# Patient Record
Sex: FEMALE | Race: OTHER | NOT HISPANIC OR LATINO
[De-identification: names, ages, dates, MRNs, and addresses within clinical notes are randomized per-mention and may not be internally consistent; named-entity substitution may affect disease eponyms.]

---

## 2017-07-06 PROBLEM — Z00.00 ENCOUNTER FOR PREVENTIVE HEALTH EXAMINATION: Status: ACTIVE | Noted: 2017-07-06

## 2017-07-11 ENCOUNTER — APPOINTMENT (OUTPATIENT)
Dept: SURGERY | Facility: CLINIC | Age: 37
End: 2017-07-11

## 2017-07-11 ENCOUNTER — OUTPATIENT (OUTPATIENT)
Dept: OUTPATIENT SERVICES | Facility: HOSPITAL | Age: 37
LOS: 1 days | End: 2017-07-11

## 2017-07-11 DIAGNOSIS — Z01.818 ENCOUNTER FOR OTHER PREPROCEDURAL EXAMINATION: ICD-10-CM

## 2017-07-11 DIAGNOSIS — L40.9 PSORIASIS, UNSPECIFIED: ICD-10-CM

## 2017-07-11 DIAGNOSIS — N88.3 INCOMPETENCE OF CERVIX UTERI: ICD-10-CM

## 2017-07-11 DIAGNOSIS — J30.9 ALLERGIC RHINITIS, UNSPECIFIED: ICD-10-CM

## 2017-07-14 ENCOUNTER — OUTPATIENT (OUTPATIENT)
Dept: OUTPATIENT SERVICES | Facility: HOSPITAL | Age: 37
LOS: 1 days | Discharge: HOME | End: 2017-07-14

## 2017-07-14 DIAGNOSIS — O26.872 CERVICAL SHORTENING, SECOND TRIMESTER: ICD-10-CM

## 2017-07-14 DIAGNOSIS — L40.9 PSORIASIS, UNSPECIFIED: ICD-10-CM

## 2017-07-14 DIAGNOSIS — Z3A.15 15 WEEKS GESTATION OF PREGNANCY: ICD-10-CM

## 2017-10-02 ENCOUNTER — INPATIENT (INPATIENT)
Facility: HOSPITAL | Age: 37
LOS: 27 days | Discharge: HOME | DRG: 778 | End: 2017-10-30
Attending: OBSTETRICS & GYNECOLOGY | Admitting: OBSTETRICS & GYNECOLOGY
Payer: COMMERCIAL

## 2017-10-02 VITALS — HEIGHT: 64 IN | WEIGHT: 119.05 LBS

## 2017-10-02 DIAGNOSIS — O26.899 OTHER SPECIFIED PREGNANCY RELATED CONDITIONS, UNSPECIFIED TRIMESTER: ICD-10-CM

## 2017-10-02 DIAGNOSIS — Z3A.00 WEEKS OF GESTATION OF PREGNANCY NOT SPECIFIED: ICD-10-CM

## 2017-10-02 LAB
ALBUMIN SERPL ELPH-MCNC: 3.3 G/DL — SIGNIFICANT CHANGE UP (ref 3.3–5)
ALP SERPL-CCNC: 108 U/L — SIGNIFICANT CHANGE UP (ref 40–120)
ALT FLD-CCNC: 19 U/L — SIGNIFICANT CHANGE UP (ref 10–45)
ANION GAP SERPL CALC-SCNC: 17 MMOL/L — SIGNIFICANT CHANGE UP (ref 5–17)
APPEARANCE UR: CLEAR — SIGNIFICANT CHANGE UP
APTT BLD: 26.2 SEC — LOW (ref 27.5–37.4)
AST SERPL-CCNC: 21 U/L — SIGNIFICANT CHANGE UP (ref 10–40)
BILIRUB SERPL-MCNC: <0.2 MG/DL — SIGNIFICANT CHANGE UP (ref 0.2–1.2)
BILIRUB UR-MCNC: NEGATIVE — SIGNIFICANT CHANGE UP
BLD GP AB SCN SERPL QL: NEGATIVE — SIGNIFICANT CHANGE UP
BUN SERPL-MCNC: 6 MG/DL — LOW (ref 7–23)
CALCIUM SERPL-MCNC: 8.7 MG/DL — SIGNIFICANT CHANGE UP (ref 8.4–10.5)
CHLORIDE SERPL-SCNC: 100 MMOL/L — SIGNIFICANT CHANGE UP (ref 96–108)
CO2 SERPL-SCNC: 20 MMOL/L — LOW (ref 22–31)
COLOR SPEC: YELLOW — SIGNIFICANT CHANGE UP
CREAT SERPL-MCNC: 0.34 MG/DL — LOW (ref 0.5–1.3)
DIFF PNL FLD: (no result)
FIBRINOGEN PPP-MCNC: 400 MG/DL — SIGNIFICANT CHANGE UP (ref 258–438)
GLUCOSE SERPL-MCNC: 86 MG/DL — SIGNIFICANT CHANGE UP (ref 70–99)
GLUCOSE UR QL: NEGATIVE — SIGNIFICANT CHANGE UP
HCT VFR BLD CALC: 32.6 % — LOW (ref 34.5–45)
HGB BLD-MCNC: 10.7 G/DL — LOW (ref 11.5–15.5)
INR BLD: 0.94 — SIGNIFICANT CHANGE UP (ref 0.88–1.16)
KETONES UR-MCNC: NEGATIVE — SIGNIFICANT CHANGE UP
LDH SERPL L TO P-CCNC: 219 U/L — SIGNIFICANT CHANGE UP (ref 50–242)
LEUKOCYTE ESTERASE UR-ACNC: (no result)
MCHC RBC-ENTMCNC: 29.1 PG — SIGNIFICANT CHANGE UP (ref 27–34)
MCHC RBC-ENTMCNC: 32.8 G/DL — SIGNIFICANT CHANGE UP (ref 32–36)
MCV RBC AUTO: 88.6 FL — SIGNIFICANT CHANGE UP (ref 80–100)
NITRITE UR-MCNC: NEGATIVE — SIGNIFICANT CHANGE UP
PH UR: 6 — SIGNIFICANT CHANGE UP (ref 5–8)
PLATELET # BLD AUTO: 200 K/UL — SIGNIFICANT CHANGE UP (ref 150–400)
POTASSIUM SERPL-MCNC: 3.8 MMOL/L — SIGNIFICANT CHANGE UP (ref 3.5–5.3)
POTASSIUM SERPL-SCNC: 3.8 MMOL/L — SIGNIFICANT CHANGE UP (ref 3.5–5.3)
PROT SERPL-MCNC: 6.7 G/DL — SIGNIFICANT CHANGE UP (ref 6–8.3)
PROT UR-MCNC: NEGATIVE MG/DL — SIGNIFICANT CHANGE UP
PROTHROM AB SERPL-ACNC: 10.4 SEC — SIGNIFICANT CHANGE UP (ref 9.8–12.7)
RBC # BLD: 3.68 M/UL — LOW (ref 3.8–5.2)
RBC # FLD: 13.4 % — SIGNIFICANT CHANGE UP (ref 10.3–16.9)
RH IG SCN BLD-IMP: POSITIVE — SIGNIFICANT CHANGE UP
RH IG SCN BLD-IMP: POSITIVE — SIGNIFICANT CHANGE UP
SODIUM SERPL-SCNC: 137 MMOL/L — SIGNIFICANT CHANGE UP (ref 135–145)
SP GR SPEC: <=1.005 — SIGNIFICANT CHANGE UP (ref 1–1.03)
T PALLIDUM AB TITR SER: NEGATIVE — SIGNIFICANT CHANGE UP
URATE SERPL-MCNC: 1.9 MG/DL — LOW (ref 2.5–7)
UROBILINOGEN FLD QL: 0.2 E.U./DL — SIGNIFICANT CHANGE UP
WBC # BLD: 13.2 K/UL — HIGH (ref 3.8–10.5)
WBC # FLD AUTO: 13.2 K/UL — HIGH (ref 3.8–10.5)

## 2017-10-02 RX ORDER — SODIUM CHLORIDE 9 MG/ML
1000 INJECTION, SOLUTION INTRAVENOUS
Qty: 0 | Refills: 0 | Status: DISCONTINUED | OUTPATIENT
Start: 2017-10-02 | End: 2017-10-15

## 2017-10-02 RX ORDER — PROGESTERONE 200 MG/1
200 CAPSULE, LIQUID FILLED ORAL AT BEDTIME
Qty: 0 | Refills: 0 | Status: DISCONTINUED | OUTPATIENT
Start: 2017-10-02 | End: 2017-10-04

## 2017-10-02 RX ORDER — NIFEDIPINE 30 MG
10 TABLET, EXTENDED RELEASE 24 HR ORAL EVERY 4 HOURS
Qty: 0 | Refills: 0 | Status: DISCONTINUED | OUTPATIENT
Start: 2017-10-02 | End: 2017-10-06

## 2017-10-02 RX ORDER — SODIUM CHLORIDE 9 MG/ML
1000 INJECTION, SOLUTION INTRAVENOUS ONCE
Qty: 0 | Refills: 0 | Status: COMPLETED | OUTPATIENT
Start: 2017-10-02 | End: 2017-10-02

## 2017-10-02 RX ADMIN — SODIUM CHLORIDE 150 MILLILITER(S): 9 INJECTION, SOLUTION INTRAVENOUS at 23:17

## 2017-10-02 RX ADMIN — Medication 10 MILLIGRAM(S): at 14:32

## 2017-10-02 RX ADMIN — Medication 1 TABLET(S): at 22:17

## 2017-10-02 RX ADMIN — SODIUM CHLORIDE 150 MILLILITER(S): 9 INJECTION, SOLUTION INTRAVENOUS at 02:28

## 2017-10-02 RX ADMIN — PROGESTERONE 200 MILLIGRAM(S): 200 CAPSULE, LIQUID FILLED ORAL at 22:16

## 2017-10-02 RX ADMIN — Medication 10 MILLIGRAM(S): at 11:26

## 2017-10-02 RX ADMIN — Medication 10 MILLIGRAM(S): at 22:17

## 2017-10-02 RX ADMIN — Medication 12 MILLIGRAM(S): at 02:53

## 2017-10-02 RX ADMIN — SODIUM CHLORIDE 2000 MILLILITER(S): 9 INJECTION, SOLUTION INTRAVENOUS at 02:00

## 2017-10-02 RX ADMIN — Medication 10 MILLIGRAM(S): at 18:38

## 2017-10-03 LAB
C TRACH RRNA SPEC QL NAA+PROBE: SIGNIFICANT CHANGE UP
CULTURE RESULTS: NO GROWTH — SIGNIFICANT CHANGE UP
N GONORRHOEA RRNA SPEC QL NAA+PROBE: SIGNIFICANT CHANGE UP
SPECIMEN SOURCE: SIGNIFICANT CHANGE UP
SPECIMEN SOURCE: SIGNIFICANT CHANGE UP

## 2017-10-03 RX ADMIN — Medication 10 MILLIGRAM(S): at 13:53

## 2017-10-03 RX ADMIN — PROGESTERONE 200 MILLIGRAM(S): 200 CAPSULE, LIQUID FILLED ORAL at 22:01

## 2017-10-03 RX ADMIN — Medication 1 TABLET(S): at 22:01

## 2017-10-03 RX ADMIN — SODIUM CHLORIDE 150 MILLILITER(S): 9 INJECTION, SOLUTION INTRAVENOUS at 05:26

## 2017-10-03 RX ADMIN — Medication 10 MILLIGRAM(S): at 09:53

## 2017-10-03 RX ADMIN — Medication 10 MILLIGRAM(S): at 22:01

## 2017-10-03 RX ADMIN — SODIUM CHLORIDE 150 MILLILITER(S): 9 INJECTION, SOLUTION INTRAVENOUS at 13:20

## 2017-10-03 RX ADMIN — Medication 10 MILLIGRAM(S): at 17:59

## 2017-10-03 RX ADMIN — Medication 12 MILLIGRAM(S): at 02:09

## 2017-10-04 LAB
CULTURE RESULTS: SIGNIFICANT CHANGE UP
SPECIMEN SOURCE: SIGNIFICANT CHANGE UP

## 2017-10-04 RX ORDER — INFLUENZA VIRUS VACCINE 15; 15; 15; 15 UG/.5ML; UG/.5ML; UG/.5ML; UG/.5ML
0.5 SUSPENSION INTRAMUSCULAR ONCE
Qty: 0 | Refills: 0 | Status: COMPLETED | OUTPATIENT
Start: 2017-10-04 | End: 2017-10-04

## 2017-10-04 RX ADMIN — Medication 10 MILLIGRAM(S): at 18:27

## 2017-10-04 RX ADMIN — Medication 10 MILLIGRAM(S): at 10:22

## 2017-10-04 RX ADMIN — SODIUM CHLORIDE 150 MILLILITER(S): 9 INJECTION, SOLUTION INTRAVENOUS at 10:30

## 2017-10-04 RX ADMIN — Medication 10 MILLIGRAM(S): at 02:11

## 2017-10-04 RX ADMIN — Medication 10 MILLIGRAM(S): at 06:15

## 2017-10-04 RX ADMIN — Medication 10 MILLIGRAM(S): at 22:04

## 2017-10-04 RX ADMIN — Medication 1 TABLET(S): at 22:05

## 2017-10-04 RX ADMIN — Medication 10 MILLIGRAM(S): at 14:00

## 2017-10-05 LAB
BASOPHILS NFR BLD AUTO: 0.1 % — SIGNIFICANT CHANGE UP (ref 0–2)
BLD GP AB SCN SERPL QL: NEGATIVE — SIGNIFICANT CHANGE UP
EOSINOPHIL NFR BLD AUTO: 1.3 % — SIGNIFICANT CHANGE UP (ref 0–6)
HCT VFR BLD CALC: 29.7 % — LOW (ref 34.5–45)
HGB BLD-MCNC: 9.9 G/DL — LOW (ref 11.5–15.5)
LYMPHOCYTES # BLD AUTO: 18.6 % — SIGNIFICANT CHANGE UP (ref 13–44)
MCHC RBC-ENTMCNC: 29.3 PG — SIGNIFICANT CHANGE UP (ref 27–34)
MCHC RBC-ENTMCNC: 33.3 G/DL — SIGNIFICANT CHANGE UP (ref 32–36)
MCV RBC AUTO: 87.9 FL — SIGNIFICANT CHANGE UP (ref 80–100)
MONOCYTES NFR BLD AUTO: 5.1 % — SIGNIFICANT CHANGE UP (ref 2–14)
NEUTROPHILS NFR BLD AUTO: 74.9 % — SIGNIFICANT CHANGE UP (ref 43–77)
PLATELET # BLD AUTO: 182 K/UL — SIGNIFICANT CHANGE UP (ref 150–400)
RBC # BLD: 3.38 M/UL — LOW (ref 3.8–5.2)
RBC # FLD: 13.9 % — SIGNIFICANT CHANGE UP (ref 10.3–16.9)
RH IG SCN BLD-IMP: POSITIVE — SIGNIFICANT CHANGE UP
WBC # BLD: 12.6 K/UL — HIGH (ref 3.8–10.5)
WBC # FLD AUTO: 12.6 K/UL — HIGH (ref 3.8–10.5)

## 2017-10-05 RX ADMIN — Medication 1 TABLET(S): at 22:21

## 2017-10-05 RX ADMIN — Medication 10 MILLIGRAM(S): at 14:02

## 2017-10-05 RX ADMIN — Medication 10 MILLIGRAM(S): at 18:08

## 2017-10-05 RX ADMIN — SODIUM CHLORIDE 150 MILLILITER(S): 9 INJECTION, SOLUTION INTRAVENOUS at 10:00

## 2017-10-05 RX ADMIN — Medication 10 MILLIGRAM(S): at 10:02

## 2017-10-05 RX ADMIN — Medication 10 MILLIGRAM(S): at 06:44

## 2017-10-05 RX ADMIN — Medication 10 MILLIGRAM(S): at 22:21

## 2017-10-05 RX ADMIN — Medication 10 MILLIGRAM(S): at 06:45

## 2017-10-05 NOTE — DIETITIAN INITIAL EVALUATION ADULT. - ENERGY NEEDS
pre-pregnancy wt: 45kg, ABW: 53KG, IBW: 54.5kg  %IBW: 83%  BMI:  17.0-> anthropometrics per pre-pregnancy wt and needs per IBW to provide additional calories as pt was on lower end of IBW; Kcal needs: 25-30kcal/IBW +340kcal: 1703-1975KCAL; Fluid: 35-40ml/IBW: 1908-2180ml

## 2017-10-05 NOTE — DIETITIAN INITIAL EVALUATION ADULT. - OTHER INFO
36 y/o F, w/ cervical insufficiency, on regular diet, follows vegeterian diet, but does not want to change diet (regular) since she wants to make more liberal choices; denies N/V/D/C; last BM was today; po intake >75% at most meals; pt gained 18#  x ~27 weeks, rec'd range 28-40# per her pre-pregnancy BMI 17.0; nutrition education on pregnancy provided, contact information left for any questions; skin intact w/ no edema.

## 2017-10-06 RX ORDER — NIFEDIPINE 30 MG
10 TABLET, EXTENDED RELEASE 24 HR ORAL ONCE
Qty: 0 | Refills: 0 | Status: COMPLETED | OUTPATIENT
Start: 2017-10-07 | End: 2017-10-07

## 2017-10-06 RX ORDER — NIFEDIPINE 30 MG
10 TABLET, EXTENDED RELEASE 24 HR ORAL EVERY 6 HOURS
Qty: 0 | Refills: 0 | Status: DISCONTINUED | OUTPATIENT
Start: 2017-10-07 | End: 2017-10-30

## 2017-10-06 RX ORDER — DOCUSATE SODIUM 100 MG
100 CAPSULE ORAL DAILY
Qty: 0 | Refills: 0 | Status: DISCONTINUED | OUTPATIENT
Start: 2017-10-06 | End: 2017-10-30

## 2017-10-06 RX ADMIN — Medication 10 MILLIGRAM(S): at 06:10

## 2017-10-06 RX ADMIN — Medication 1 TABLET(S): at 22:07

## 2017-10-06 RX ADMIN — Medication 10 MILLIGRAM(S): at 10:22

## 2017-10-06 RX ADMIN — Medication 10 MILLIGRAM(S): at 14:09

## 2017-10-06 RX ADMIN — SODIUM CHLORIDE 150 MILLILITER(S): 9 INJECTION, SOLUTION INTRAVENOUS at 15:40

## 2017-10-06 RX ADMIN — Medication 10 MILLIGRAM(S): at 22:07

## 2017-10-07 RX ORDER — ACETAMINOPHEN 500 MG
650 TABLET ORAL ONCE
Qty: 0 | Refills: 0 | Status: COMPLETED | OUTPATIENT
Start: 2017-10-07 | End: 2017-10-07

## 2017-10-07 RX ADMIN — Medication 10 MILLIGRAM(S): at 23:44

## 2017-10-07 RX ADMIN — Medication 10 MILLIGRAM(S): at 12:28

## 2017-10-07 RX ADMIN — Medication 10 MILLIGRAM(S): at 02:04

## 2017-10-07 RX ADMIN — Medication 10 MILLIGRAM(S): at 18:35

## 2017-10-07 RX ADMIN — Medication 10 MILLIGRAM(S): at 07:01

## 2017-10-07 RX ADMIN — Medication 650 MILLIGRAM(S): at 14:12

## 2017-10-07 RX ADMIN — Medication 1 TABLET(S): at 23:05

## 2017-10-07 RX ADMIN — Medication 650 MILLIGRAM(S): at 15:00

## 2017-10-08 LAB
BLD GP AB SCN SERPL QL: NEGATIVE — SIGNIFICANT CHANGE UP
RH IG SCN BLD-IMP: POSITIVE — SIGNIFICANT CHANGE UP

## 2017-10-08 RX ORDER — HEPARIN SODIUM 5000 [USP'U]/ML
5000 INJECTION INTRAVENOUS; SUBCUTANEOUS EVERY 12 HOURS
Qty: 0 | Refills: 0 | Status: DISCONTINUED | OUTPATIENT
Start: 2017-10-08 | End: 2017-10-30

## 2017-10-08 RX ADMIN — Medication 30 MILLILITER(S): at 15:36

## 2017-10-08 RX ADMIN — Medication 10 MILLIGRAM(S): at 18:08

## 2017-10-08 RX ADMIN — Medication 10 MILLIGRAM(S): at 12:03

## 2017-10-08 RX ADMIN — Medication 10 MILLIGRAM(S): at 06:34

## 2017-10-08 RX ADMIN — HEPARIN SODIUM 5000 UNIT(S): 5000 INJECTION INTRAVENOUS; SUBCUTANEOUS at 19:10

## 2017-10-08 RX ADMIN — Medication 1 TABLET(S): at 22:15

## 2017-10-09 RX ADMIN — Medication 10 MILLIGRAM(S): at 12:30

## 2017-10-09 RX ADMIN — Medication 1 TABLET(S): at 21:39

## 2017-10-09 RX ADMIN — Medication 10 MILLIGRAM(S): at 06:33

## 2017-10-09 RX ADMIN — Medication 10 MILLIGRAM(S): at 19:10

## 2017-10-09 RX ADMIN — Medication 10 MILLIGRAM(S): at 00:01

## 2017-10-09 RX ADMIN — HEPARIN SODIUM 5000 UNIT(S): 5000 INJECTION INTRAVENOUS; SUBCUTANEOUS at 18:30

## 2017-10-09 RX ADMIN — HEPARIN SODIUM 5000 UNIT(S): 5000 INJECTION INTRAVENOUS; SUBCUTANEOUS at 07:09

## 2017-10-10 LAB
GLUCOSE SERPL-MCNC: 152 MG/DL — HIGH (ref 70–99)
HCT VFR BLD CALC: 32.3 % — LOW (ref 34.5–45)
HGB BLD-MCNC: 11 G/DL — LOW (ref 11.5–15.5)
MCHC RBC-ENTMCNC: 29.7 PG — SIGNIFICANT CHANGE UP (ref 27–34)
MCHC RBC-ENTMCNC: 34.1 G/DL — SIGNIFICANT CHANGE UP (ref 32–36)
MCV RBC AUTO: 87.3 FL — SIGNIFICANT CHANGE UP (ref 80–100)
PLATELET # BLD AUTO: 189 K/UL — SIGNIFICANT CHANGE UP (ref 150–400)
RBC # BLD: 3.7 M/UL — LOW (ref 3.8–5.2)
RBC # FLD: 13.7 % — SIGNIFICANT CHANGE UP (ref 10.3–16.9)
WBC # BLD: 11.4 K/UL — HIGH (ref 3.8–10.5)
WBC # FLD AUTO: 11.4 K/UL — HIGH (ref 3.8–10.5)

## 2017-10-10 RX ORDER — DEXTROSE 50 % IN WATER 50 %
100 SYRINGE (ML) INTRAVENOUS ONCE
Qty: 0 | Refills: 0 | Status: COMPLETED | OUTPATIENT
Start: 2017-10-11 | End: 2017-10-11

## 2017-10-10 RX ORDER — DEXTROSE 50 % IN WATER 50 %
50 SYRINGE (ML) INTRAVENOUS ONCE
Qty: 0 | Refills: 0 | Status: DISCONTINUED | OUTPATIENT
Start: 2017-10-10 | End: 2017-10-12

## 2017-10-10 RX ADMIN — HEPARIN SODIUM 5000 UNIT(S): 5000 INJECTION INTRAVENOUS; SUBCUTANEOUS at 06:28

## 2017-10-10 RX ADMIN — Medication 10 MILLIGRAM(S): at 17:48

## 2017-10-10 RX ADMIN — Medication 10 MILLIGRAM(S): at 06:28

## 2017-10-10 RX ADMIN — Medication 10 MILLIGRAM(S): at 12:21

## 2017-10-10 RX ADMIN — Medication 1 TABLET(S): at 21:51

## 2017-10-10 RX ADMIN — Medication 10 MILLIGRAM(S): at 00:09

## 2017-10-10 RX ADMIN — Medication 10 MILLIGRAM(S): at 23:57

## 2017-10-11 LAB
BLD GP AB SCN SERPL QL: NEGATIVE — SIGNIFICANT CHANGE UP
GLUCOSE SERPL-MCNC: 128 MG/DL — HIGH (ref 70–99)
GLUCOSE SERPL-MCNC: 155 MG/DL — HIGH (ref 70–99)
GLUCOSE SERPL-MCNC: 159 MG/DL — HIGH (ref 70–99)
GLUCOSE SERPL-MCNC: 82 MG/DL — SIGNIFICANT CHANGE UP (ref 70–99)
RH IG SCN BLD-IMP: POSITIVE — SIGNIFICANT CHANGE UP

## 2017-10-11 RX ADMIN — Medication 100 MILLIGRAM(S): at 14:10

## 2017-10-11 RX ADMIN — Medication 10 MILLIGRAM(S): at 12:07

## 2017-10-11 RX ADMIN — Medication 10 MILLIGRAM(S): at 18:14

## 2017-10-11 RX ADMIN — Medication 100 GRAM(S): at 07:20

## 2017-10-11 RX ADMIN — Medication 1 TABLET(S): at 21:55

## 2017-10-11 RX ADMIN — Medication 10 MILLIGRAM(S): at 05:51

## 2017-10-12 RX ADMIN — Medication 10 MILLIGRAM(S): at 12:21

## 2017-10-12 RX ADMIN — Medication 10 MILLIGRAM(S): at 18:16

## 2017-10-12 RX ADMIN — Medication 10 MILLIGRAM(S): at 01:04

## 2017-10-12 RX ADMIN — Medication 10 MILLIGRAM(S): at 23:51

## 2017-10-12 RX ADMIN — Medication 1 TABLET(S): at 22:33

## 2017-10-12 RX ADMIN — Medication 10 MILLIGRAM(S): at 06:29

## 2017-10-12 NOTE — CHART NOTE - NSCHARTNOTEFT_GEN_A_CORE
Admitting Diagnosis:   Patient is a 37y old  Female who presents with a chief complaint of cervical insufficiency.     PAST MEDICAL & SURGICAL HISTORY:      Current Nutrition Order: regular       PO Intake: Good (%) [  X]  Fair (50-75%) [   ] Poor (<25%) [   ]    GI Issues: none, last BM yesterday    Pain: none    Skin Integrity: intact    Labs:   10-11    x   |  x   |  x   ----------------------------<  128<H>  x    |  x   |  x         CAPILLARY BLOOD GLUCOSE          Medications:  MEDICATIONS  (STANDING):  docusate sodium 100 milliGRAM(s) Oral daily  heparin  Injectable 5000 Unit(s) SubCutaneous every 12 hours  lactated ringers. 1000 milliLiter(s) (150 mL/Hr) IV Continuous <Continuous>  NIFEdipine IR 10 milliGRAM(s) Oral every 6 hours  prenatal multivitamin 1 Tablet(s) Oral daily  progesterone vaginal capsule 200 milliGRAM(s) 200 milliGRAM(s) Vaginal at bedtime    MEDICATIONS  (PRN):      Weight: (10/2) 54kg  Daily     Daily     Weight Change: no updates on wt to assess; wt updates will appreciated to assess current wt status    Estimated energy needs:   pre-pregnancy wt: 45kg, IBW: 54.5kg  %IBW: 83%  BMI:  17.0-> will use IBW for additional needs per low BMI; Kcal needs: 25-30kcal/IBW +452kcal: 1815-2087Kcal; protein: 1.4-1.6g/IBW: 76.3-87.2g;  Fluid: 35-40ml/IBW: 1908-2180ml    Subjective: pt lays comfortably in her bed; 28 weeks of gestational age; no c/o pain, GI issues; reports eating >75% at meals between the food available inhouse and brought in by pt's family; pt has alternative menu to vary her selections; wt updates will be beneficial to assess pt's status; nutrition education with important nutrients per pregnancy reviewed, pt is compliant w/ pregnancy nutrition therapy; skin intact w/ no edema.     Previous Nutrition Diagnosis: Increased nutrient needs RT increased demand for nutrients per anabolism AEB pregnancy (3rd trimester)      Active [  X]  Resolved [   ]    If resolved, new PES:     Goal: Pt to meet >75% EER without nutrient deficiencies    Recommendations: -encourage po intake w/ meals and snacks; provide pt's food preferences.   -continue w/ prenatal MVI   -provide/review nutrition education as needed     Education: pregnancy nutrition therapy     Risk Level: High [   ] Moderate [  X ] Low [   ]

## 2017-10-13 RX ORDER — SODIUM CHLORIDE 9 MG/ML
1000 INJECTION, SOLUTION INTRAVENOUS
Qty: 0 | Refills: 0 | Status: DISCONTINUED | OUTPATIENT
Start: 2017-10-13 | End: 2017-10-15

## 2017-10-13 RX ADMIN — Medication 10 MILLIGRAM(S): at 12:00

## 2017-10-13 RX ADMIN — Medication 10 MILLIGRAM(S): at 23:58

## 2017-10-13 RX ADMIN — Medication 1 TABLET(S): at 22:08

## 2017-10-13 RX ADMIN — Medication 100 MILLIGRAM(S): at 12:00

## 2017-10-13 RX ADMIN — Medication 10 MILLIGRAM(S): at 06:32

## 2017-10-13 RX ADMIN — Medication 10 MILLIGRAM(S): at 18:14

## 2017-10-13 RX ADMIN — SODIUM CHLORIDE 125 MILLILITER(S): 9 INJECTION, SOLUTION INTRAVENOUS at 18:01

## 2017-10-14 RX ADMIN — Medication 1 TABLET(S): at 22:55

## 2017-10-14 RX ADMIN — Medication 10 MILLIGRAM(S): at 18:05

## 2017-10-14 RX ADMIN — Medication 10 MILLIGRAM(S): at 11:57

## 2017-10-14 RX ADMIN — Medication 10 MILLIGRAM(S): at 06:01

## 2017-10-14 RX ADMIN — Medication 100 MILLIGRAM(S): at 11:57

## 2017-10-14 RX ADMIN — SODIUM CHLORIDE 125 MILLILITER(S): 9 INJECTION, SOLUTION INTRAVENOUS at 11:57

## 2017-10-14 RX ADMIN — SODIUM CHLORIDE 125 MILLILITER(S): 9 INJECTION, SOLUTION INTRAVENOUS at 19:21

## 2017-10-15 RX ADMIN — Medication 10 MILLIGRAM(S): at 07:11

## 2017-10-15 RX ADMIN — Medication 100 MILLIGRAM(S): at 11:49

## 2017-10-15 RX ADMIN — Medication 10 MILLIGRAM(S): at 11:49

## 2017-10-15 RX ADMIN — Medication 10 MILLIGRAM(S): at 00:04

## 2017-10-15 RX ADMIN — Medication 1 TABLET(S): at 22:12

## 2017-10-15 RX ADMIN — Medication 10 MILLIGRAM(S): at 18:16

## 2017-10-15 RX ADMIN — Medication 10 MILLIGRAM(S): at 23:52

## 2017-10-16 LAB
BLD GP AB SCN SERPL QL: NEGATIVE — SIGNIFICANT CHANGE UP
HCT VFR BLD CALC: 29.4 % — LOW (ref 34.5–45)
HGB BLD-MCNC: 9.9 G/DL — LOW (ref 11.5–15.5)
MCHC RBC-ENTMCNC: 29.6 PG — SIGNIFICANT CHANGE UP (ref 27–34)
MCHC RBC-ENTMCNC: 33.7 G/DL — SIGNIFICANT CHANGE UP (ref 32–36)
MCV RBC AUTO: 87.8 FL — SIGNIFICANT CHANGE UP (ref 80–100)
PLATELET # BLD AUTO: 172 K/UL — SIGNIFICANT CHANGE UP (ref 150–400)
RBC # BLD: 3.35 M/UL — LOW (ref 3.8–5.2)
RBC # FLD: 13.7 % — SIGNIFICANT CHANGE UP (ref 10.3–16.9)
RH IG SCN BLD-IMP: POSITIVE — SIGNIFICANT CHANGE UP
WBC # BLD: 11.7 K/UL — HIGH (ref 3.8–10.5)
WBC # FLD AUTO: 11.7 K/UL — HIGH (ref 3.8–10.5)

## 2017-10-16 RX ORDER — ACETAMINOPHEN 500 MG
650 TABLET ORAL ONCE
Qty: 0 | Refills: 0 | Status: COMPLETED | OUTPATIENT
Start: 2017-10-16 | End: 2017-10-16

## 2017-10-16 RX ORDER — SODIUM CHLORIDE 9 MG/ML
125 INJECTION, SOLUTION INTRAVENOUS ONCE
Qty: 0 | Refills: 0 | Status: COMPLETED | OUTPATIENT
Start: 2017-10-16 | End: 2017-10-16

## 2017-10-16 RX ORDER — ACETAMINOPHEN 500 MG
500 TABLET ORAL ONCE
Qty: 0 | Refills: 0 | Status: DISCONTINUED | OUTPATIENT
Start: 2017-10-16 | End: 2017-10-16

## 2017-10-16 RX ADMIN — SODIUM CHLORIDE 125 MILLILITER(S): 9 INJECTION, SOLUTION INTRAVENOUS at 22:38

## 2017-10-16 RX ADMIN — Medication 10 MILLIGRAM(S): at 18:16

## 2017-10-16 RX ADMIN — Medication 650 MILLIGRAM(S): at 20:49

## 2017-10-16 RX ADMIN — Medication 10 MILLIGRAM(S): at 06:19

## 2017-10-16 RX ADMIN — Medication 10 MILLIGRAM(S): at 23:55

## 2017-10-16 RX ADMIN — Medication 650 MILLIGRAM(S): at 20:00

## 2017-10-16 RX ADMIN — Medication 100 MILLIGRAM(S): at 12:04

## 2017-10-16 RX ADMIN — Medication 1 TABLET(S): at 21:14

## 2017-10-16 RX ADMIN — Medication 10 MILLIGRAM(S): at 12:04

## 2017-10-17 RX ORDER — FERROUS SULFATE 325(65) MG
325 TABLET ORAL DAILY
Qty: 0 | Refills: 0 | Status: DISCONTINUED | OUTPATIENT
Start: 2017-10-17 | End: 2017-10-30

## 2017-10-17 RX ORDER — ACETAMINOPHEN 500 MG
650 TABLET ORAL ONCE
Qty: 0 | Refills: 0 | Status: COMPLETED | OUTPATIENT
Start: 2017-10-17 | End: 2017-10-17

## 2017-10-17 RX ADMIN — Medication 100 MILLIGRAM(S): at 11:55

## 2017-10-17 RX ADMIN — Medication 10 MILLIGRAM(S): at 05:54

## 2017-10-17 RX ADMIN — Medication 10 MILLIGRAM(S): at 17:42

## 2017-10-17 RX ADMIN — Medication 1 TABLET(S): at 21:46

## 2017-10-17 RX ADMIN — Medication 650 MILLIGRAM(S): at 16:40

## 2017-10-17 RX ADMIN — Medication 10 MILLIGRAM(S): at 23:58

## 2017-10-17 RX ADMIN — Medication 650 MILLIGRAM(S): at 14:30

## 2017-10-17 RX ADMIN — Medication 325 MILLIGRAM(S): at 17:42

## 2017-10-17 RX ADMIN — Medication 10 MILLIGRAM(S): at 11:54

## 2017-10-17 NOTE — PHYSICAL THERAPY INITIAL EVALUATION ADULT - PERTINENT HX OF CURRENT PROBLEM, REHAB EVAL
Patient is a 37 year old female admitted for  contractions and short cervix w/cerlage, on bedrest with bathroom privileges

## 2017-10-18 RX ADMIN — Medication 100 MILLIGRAM(S): at 12:05

## 2017-10-18 RX ADMIN — Medication 10 MILLIGRAM(S): at 06:47

## 2017-10-18 RX ADMIN — Medication 325 MILLIGRAM(S): at 12:04

## 2017-10-18 RX ADMIN — Medication 10 MILLIGRAM(S): at 12:04

## 2017-10-18 RX ADMIN — Medication 1 TABLET(S): at 23:30

## 2017-10-18 RX ADMIN — Medication 10 MILLIGRAM(S): at 19:02

## 2017-10-19 LAB
BLD GP AB SCN SERPL QL: NEGATIVE — SIGNIFICANT CHANGE UP
HCT VFR BLD CALC: 30.8 % — LOW (ref 34.5–45)
HGB BLD-MCNC: 10.5 G/DL — LOW (ref 11.5–15.5)
MCHC RBC-ENTMCNC: 29.7 PG — SIGNIFICANT CHANGE UP (ref 27–34)
MCHC RBC-ENTMCNC: 34.1 G/DL — SIGNIFICANT CHANGE UP (ref 32–36)
MCV RBC AUTO: 87.3 FL — SIGNIFICANT CHANGE UP (ref 80–100)
PLATELET # BLD AUTO: 183 K/UL — SIGNIFICANT CHANGE UP (ref 150–400)
RBC # BLD: 3.53 M/UL — LOW (ref 3.8–5.2)
RBC # FLD: 13.7 % — SIGNIFICANT CHANGE UP (ref 10.3–16.9)
RH IG SCN BLD-IMP: POSITIVE — SIGNIFICANT CHANGE UP
WBC # BLD: 11.4 K/UL — HIGH (ref 3.8–10.5)
WBC # FLD AUTO: 11.4 K/UL — HIGH (ref 3.8–10.5)

## 2017-10-19 RX ADMIN — Medication 325 MILLIGRAM(S): at 12:43

## 2017-10-19 RX ADMIN — Medication 1 TABLET(S): at 22:43

## 2017-10-19 RX ADMIN — Medication 10 MILLIGRAM(S): at 12:43

## 2017-10-19 RX ADMIN — Medication 100 MILLIGRAM(S): at 12:43

## 2017-10-19 RX ADMIN — Medication 10 MILLIGRAM(S): at 18:46

## 2017-10-19 RX ADMIN — Medication 10 MILLIGRAM(S): at 00:56

## 2017-10-19 RX ADMIN — Medication 10 MILLIGRAM(S): at 06:58

## 2017-10-19 NOTE — CHART NOTE - NSCHARTNOTEFT_GEN_A_CORE
Admitting Diagnosis:   Patient is a 37y old  Female admitted w/  contractions w/ short cervix and cerclage, on bedrest.     PAST MEDICAL & SURGICAL HISTORY:      Current Nutrition Order: regular       PO Intake: Good (%) [ X ]  Fair (50-75%) [   ] Poor (<25%) [   ]    GI Issues: no GI stress, last BM was yesterday    Pain: no c/o pain    Skin Integrity: intact    Labs: 10/19: Hgb 10.5/Hct 30.8        CAPILLARY BLOOD GLUCOSE          Medications:  MEDICATIONS  (STANDING):  docusate sodium 100 milliGRAM(s) Oral daily  ferrous    sulfate 325 milliGRAM(s) Oral daily  heparin  Injectable 5000 Unit(s) SubCutaneous every 12 hours  NIFEdipine IR 10 milliGRAM(s) Oral every 6 hours  prenatal multivitamin 1 Tablet(s) Oral daily  progesterone vaginal capsule 200 milliGRAM(s) 200 milliGRAM(s) Vaginal at bedtime    MEDICATIONS  (PRN):      Weight: 10/14: 55.5kg,   Daily     Daily     Weight Change: 1.5kg wt gain since admission 10/2(54kg) desirable.     Estimated energy needs: pt at 3rd trimester (at 29 weeks of gestation); ibw: 54.5kg, ppw: 45kg, BMI 17.0; 83% of IBW; since pt w/ low body wt and expected to gain 28-40lb; will use IBW for needs Kcal needs: 25-30kcal/IBW +452kcal: 1815-2087Kcal;   protein: 1.4-1.6g/IBW: 76.3-87.2g;    Fluid: 35-40ml/IBW: 1908-2180ml      Subjective: pt is tolerating diet w/ > 75% reported po intake; denies any GI discomfort; making selections from alternatives as she likes; skin intact w/ no edema. Nutrition education for pregnancy reviewed.     Previous Nutrition Diagnosis: Increased nutrient needs RT increased demand for nutrients per anabolism AEB pregnancy (3rd trimester)      Active [ X ]  Resolved [   ]    If resolved, new PES:     Goal: Pt to meet >75% EER without nutrient deficiencies    Recommendations: -encourage po intake w/ meals and snacks; provide pt's food preferences.   -continue w/ prenatal MVI   -provide/review nutrition education as needed     Education: pregnancy nutrition therapy       Risk Level: High [   ] Moderate [ x ] Low [   ]

## 2017-10-20 RX ORDER — ACETAMINOPHEN 500 MG
650 TABLET ORAL EVERY 6 HOURS
Qty: 0 | Refills: 0 | Status: DISCONTINUED | OUTPATIENT
Start: 2017-10-20 | End: 2017-10-30

## 2017-10-20 RX ADMIN — Medication 10 MILLIGRAM(S): at 12:14

## 2017-10-20 RX ADMIN — Medication 1 TABLET(S): at 22:25

## 2017-10-20 RX ADMIN — Medication 10 MILLIGRAM(S): at 18:06

## 2017-10-20 RX ADMIN — Medication 100 MILLIGRAM(S): at 12:15

## 2017-10-20 RX ADMIN — Medication 10 MILLIGRAM(S): at 00:01

## 2017-10-20 RX ADMIN — Medication 325 MILLIGRAM(S): at 12:14

## 2017-10-20 RX ADMIN — Medication 10 MILLIGRAM(S): at 23:56

## 2017-10-20 RX ADMIN — Medication 10 MILLIGRAM(S): at 06:43

## 2017-10-21 RX ADMIN — Medication 10 MILLIGRAM(S): at 12:42

## 2017-10-21 RX ADMIN — Medication 650 MILLIGRAM(S): at 14:15

## 2017-10-21 RX ADMIN — Medication 10 MILLIGRAM(S): at 06:18

## 2017-10-21 RX ADMIN — Medication 1 TABLET(S): at 22:16

## 2017-10-21 RX ADMIN — Medication 650 MILLIGRAM(S): at 14:41

## 2017-10-21 RX ADMIN — Medication 100 MILLIGRAM(S): at 12:43

## 2017-10-21 RX ADMIN — Medication 10 MILLIGRAM(S): at 18:22

## 2017-10-21 RX ADMIN — Medication 325 MILLIGRAM(S): at 12:43

## 2017-10-22 RX ADMIN — Medication 10 MILLIGRAM(S): at 00:08

## 2017-10-22 RX ADMIN — Medication 325 MILLIGRAM(S): at 12:04

## 2017-10-22 RX ADMIN — Medication 10 MILLIGRAM(S): at 12:12

## 2017-10-22 RX ADMIN — Medication 10 MILLIGRAM(S): at 18:35

## 2017-10-22 RX ADMIN — Medication 100 MILLIGRAM(S): at 12:04

## 2017-10-22 RX ADMIN — Medication 1 TABLET(S): at 22:10

## 2017-10-22 RX ADMIN — Medication 10 MILLIGRAM(S): at 06:32

## 2017-10-23 RX ADMIN — Medication 10 MILLIGRAM(S): at 23:51

## 2017-10-23 RX ADMIN — Medication 10 MILLIGRAM(S): at 12:11

## 2017-10-23 RX ADMIN — Medication 325 MILLIGRAM(S): at 12:10

## 2017-10-23 RX ADMIN — Medication 10 MILLIGRAM(S): at 06:19

## 2017-10-23 RX ADMIN — Medication 1 TABLET(S): at 21:37

## 2017-10-23 RX ADMIN — Medication 10 MILLIGRAM(S): at 00:14

## 2017-10-23 RX ADMIN — Medication 10 MILLIGRAM(S): at 18:05

## 2017-10-23 RX ADMIN — Medication 100 MILLIGRAM(S): at 12:10

## 2017-10-24 VITALS
TEMPERATURE: 97 F | SYSTOLIC BLOOD PRESSURE: 93 MMHG | HEART RATE: 85 BPM | DIASTOLIC BLOOD PRESSURE: 59 MMHG | RESPIRATION RATE: 18 BRPM

## 2017-10-24 RX ADMIN — Medication 10 MILLIGRAM(S): at 12:36

## 2017-10-24 RX ADMIN — Medication 10 MILLIGRAM(S): at 18:15

## 2017-10-24 RX ADMIN — Medication 10 MILLIGRAM(S): at 06:18

## 2017-10-24 RX ADMIN — Medication 325 MILLIGRAM(S): at 12:36

## 2017-10-24 RX ADMIN — Medication 100 MILLIGRAM(S): at 12:36

## 2017-10-25 ENCOUNTER — TRANSCRIPTION ENCOUNTER (OUTPATIENT)
Age: 37
End: 2017-10-25

## 2017-10-25 LAB
APTT BLD: 28.2 SEC — SIGNIFICANT CHANGE UP (ref 27.5–37.4)
BLD GP AB SCN SERPL QL: NEGATIVE — SIGNIFICANT CHANGE UP
FIBRINOGEN PPP-MCNC: 528 MG/DL — HIGH (ref 258–438)
HCT VFR BLD CALC: 32.3 % — LOW (ref 34.5–45)
HGB BLD-MCNC: 10.5 G/DL — LOW (ref 11.5–15.5)
INR BLD: 1 — SIGNIFICANT CHANGE UP (ref 0.88–1.16)
MCHC RBC-ENTMCNC: 29.2 PG — SIGNIFICANT CHANGE UP (ref 27–34)
MCHC RBC-ENTMCNC: 32.5 G/DL — SIGNIFICANT CHANGE UP (ref 32–36)
MCV RBC AUTO: 90 FL — SIGNIFICANT CHANGE UP (ref 80–100)
PLATELET # BLD AUTO: 182 K/UL — SIGNIFICANT CHANGE UP (ref 150–400)
PROTHROM AB SERPL-ACNC: 11.1 SEC — SIGNIFICANT CHANGE UP (ref 9.8–12.7)
RBC # BLD: 3.59 M/UL — LOW (ref 3.8–5.2)
RBC # FLD: 13.4 % — SIGNIFICANT CHANGE UP (ref 10.3–16.9)
RH IG SCN BLD-IMP: POSITIVE — SIGNIFICANT CHANGE UP
WBC # BLD: 11.3 K/UL — HIGH (ref 3.8–10.5)
WBC # FLD AUTO: 11.3 K/UL — HIGH (ref 3.8–10.5)

## 2017-10-25 RX ADMIN — Medication 1 TABLET(S): at 22:04

## 2017-10-25 RX ADMIN — Medication 10 MILLIGRAM(S): at 00:10

## 2017-10-25 RX ADMIN — Medication 10 MILLIGRAM(S): at 18:19

## 2017-10-25 RX ADMIN — Medication 1 TABLET(S): at 00:10

## 2017-10-25 RX ADMIN — Medication 10 MILLIGRAM(S): at 11:55

## 2017-10-25 RX ADMIN — Medication 100 MILLIGRAM(S): at 11:55

## 2017-10-25 RX ADMIN — Medication 325 MILLIGRAM(S): at 11:55

## 2017-10-25 RX ADMIN — Medication 10 MILLIGRAM(S): at 05:52

## 2017-10-25 NOTE — DISCHARGE NOTE ANTEPARTUM - PATIENT PORTAL LINK FT
“You can access the FollowHealth Patient Portal, offered by [facility], by registering with the following website: [email or web address]”

## 2017-10-25 NOTE — DISCHARGE NOTE ANTEPARTUM - HOSPITAL COURSE
Pt admitted in the setting of  contractions and then found to have a cervical length of 2.5, shortest 1.9 on 10/2. Over her time of admission she was given steroids She refused SQH during bedrest. She also received nifedipine 10mg standing for tocolysis. At time of discharge her cervical length was stable and contractions have spaced out. VSS and pt to be discharged Pt admitted in the setting of  contractions and then found to have a cervical length of 2.5, shortest 1.5 on 10/30. Over her time of admission she was given steroids She refused SQH during bedrest. She also received nifedipine 10mg standing for tocolysis. At time of discharge her cervical length was stable and contractions have spaced out. VSS and pt to be discharged

## 2017-10-25 NOTE — DISCHARGE NOTE ANTEPARTUM - PLAN OF CARE
Return home Resume bedrest with limited ambulation. Follow up in a week for U/S at  and in office. Return to L&D if contractions get closer together or more painful, you break your water or do not feel the baby move.

## 2017-10-25 NOTE — DISCHARGE NOTE ANTEPARTUM - CARE PROVIDER_API CALL
Bonifacio Mims), Obstetrics and Gynecology  [address] [place] [address]  Phone: [phone]  Fax: [phone]

## 2017-10-25 NOTE — DISCHARGE NOTE ANTEPARTUM - CARE PLAN
Principal Discharge DX:	 contractions  Goal:	Return home  Instructions for follow-up, activity and diet:	Resume bedrest with limited ambulation. Follow up in a week for U/S at  and in office. Return to L&D if contractions get closer together or more painful, you break your water or do not feel the baby move.

## 2017-10-26 RX ADMIN — Medication 10 MILLIGRAM(S): at 12:31

## 2017-10-26 RX ADMIN — Medication 100 MILLIGRAM(S): at 12:31

## 2017-10-26 RX ADMIN — Medication 10 MILLIGRAM(S): at 23:56

## 2017-10-26 RX ADMIN — Medication 10 MILLIGRAM(S): at 18:14

## 2017-10-26 RX ADMIN — Medication 10 MILLIGRAM(S): at 02:45

## 2017-10-26 RX ADMIN — Medication 1 TABLET(S): at 21:59

## 2017-10-26 RX ADMIN — Medication 325 MILLIGRAM(S): at 12:31

## 2017-10-26 RX ADMIN — Medication 10 MILLIGRAM(S): at 06:20

## 2017-10-27 LAB
CULTURE RESULTS: SIGNIFICANT CHANGE UP
SPECIMEN SOURCE: SIGNIFICANT CHANGE UP

## 2017-10-27 RX ADMIN — Medication 10 MILLIGRAM(S): at 06:01

## 2017-10-27 RX ADMIN — Medication 1 TABLET(S): at 22:08

## 2017-10-27 RX ADMIN — Medication 10 MILLIGRAM(S): at 17:52

## 2017-10-27 RX ADMIN — Medication 325 MILLIGRAM(S): at 12:36

## 2017-10-27 RX ADMIN — Medication 100 MILLIGRAM(S): at 12:36

## 2017-10-27 RX ADMIN — Medication 10 MILLIGRAM(S): at 12:36

## 2017-10-28 RX ADMIN — Medication 10 MILLIGRAM(S): at 12:23

## 2017-10-28 RX ADMIN — Medication 10 MILLIGRAM(S): at 18:51

## 2017-10-28 RX ADMIN — Medication 325 MILLIGRAM(S): at 12:23

## 2017-10-28 RX ADMIN — Medication 10 MILLIGRAM(S): at 00:02

## 2017-10-28 RX ADMIN — Medication 100 MILLIGRAM(S): at 12:24

## 2017-10-28 RX ADMIN — Medication 10 MILLIGRAM(S): at 06:03

## 2017-10-29 LAB
APPEARANCE UR: (no result)
BILIRUB UR-MCNC: NEGATIVE — SIGNIFICANT CHANGE UP
COLOR SPEC: YELLOW — SIGNIFICANT CHANGE UP
DIFF PNL FLD: (no result)
GLUCOSE UR QL: NEGATIVE — SIGNIFICANT CHANGE UP
KETONES UR-MCNC: NEGATIVE — SIGNIFICANT CHANGE UP
LEUKOCYTE ESTERASE UR-ACNC: (no result)
NITRITE UR-MCNC: NEGATIVE — SIGNIFICANT CHANGE UP
PH UR: 6 — SIGNIFICANT CHANGE UP (ref 5–8)
PROT UR-MCNC: NEGATIVE MG/DL — SIGNIFICANT CHANGE UP
SP GR SPEC: <=1.005 — SIGNIFICANT CHANGE UP (ref 1–1.03)
UROBILINOGEN FLD QL: 0.2 E.U./DL — SIGNIFICANT CHANGE UP

## 2017-10-29 RX ORDER — SODIUM CHLORIDE 9 MG/ML
1000 INJECTION, SOLUTION INTRAVENOUS
Qty: 0 | Refills: 0 | Status: DISCONTINUED | OUTPATIENT
Start: 2017-10-29 | End: 2017-10-30

## 2017-10-29 RX ORDER — SODIUM CHLORIDE 9 MG/ML
1000 INJECTION, SOLUTION INTRAVENOUS ONCE
Qty: 0 | Refills: 0 | Status: COMPLETED | OUTPATIENT
Start: 2017-10-29 | End: 2017-10-29

## 2017-10-29 RX ADMIN — Medication 1 TABLET(S): at 05:03

## 2017-10-29 RX ADMIN — Medication 1 TABLET(S): at 21:12

## 2017-10-29 RX ADMIN — SODIUM CHLORIDE 2000 MILLILITER(S): 9 INJECTION, SOLUTION INTRAVENOUS at 19:44

## 2017-10-29 RX ADMIN — Medication 1 TABLET(S): at 04:03

## 2017-10-29 RX ADMIN — SODIUM CHLORIDE 125 MILLILITER(S): 9 INJECTION, SOLUTION INTRAVENOUS at 20:10

## 2017-10-29 RX ADMIN — Medication 10 MILLIGRAM(S): at 06:22

## 2017-10-29 RX ADMIN — Medication 325 MILLIGRAM(S): at 12:07

## 2017-10-29 RX ADMIN — Medication 10 MILLIGRAM(S): at 18:56

## 2017-10-29 RX ADMIN — Medication 1 TABLET(S): at 04:04

## 2017-10-29 RX ADMIN — Medication 100 MILLIGRAM(S): at 12:07

## 2017-10-29 RX ADMIN — Medication 10 MILLIGRAM(S): at 12:07

## 2017-10-30 LAB
CULTURE RESULTS: NO GROWTH — SIGNIFICANT CHANGE UP
SPECIMEN SOURCE: SIGNIFICANT CHANGE UP

## 2017-10-30 PROCEDURE — 87086 URINE CULTURE/COLONY COUNT: CPT

## 2017-10-30 PROCEDURE — 85384 FIBRINOGEN ACTIVITY: CPT

## 2017-10-30 PROCEDURE — 87081 CULTURE SCREEN ONLY: CPT

## 2017-10-30 PROCEDURE — 80053 COMPREHEN METABOLIC PANEL: CPT

## 2017-10-30 PROCEDURE — 84550 ASSAY OF BLOOD/URIC ACID: CPT

## 2017-10-30 PROCEDURE — 85027 COMPLETE CBC AUTOMATED: CPT

## 2017-10-30 PROCEDURE — 86900 BLOOD TYPING SEROLOGIC ABO: CPT

## 2017-10-30 PROCEDURE — 85025 COMPLETE CBC W/AUTO DIFF WBC: CPT

## 2017-10-30 PROCEDURE — 83615 LACTATE (LD) (LDH) ENZYME: CPT

## 2017-10-30 PROCEDURE — 81001 URINALYSIS AUTO W/SCOPE: CPT

## 2017-10-30 PROCEDURE — 86901 BLOOD TYPING SEROLOGIC RH(D): CPT

## 2017-10-30 PROCEDURE — 85730 THROMBOPLASTIN TIME PARTIAL: CPT

## 2017-10-30 PROCEDURE — 36415 COLL VENOUS BLD VENIPUNCTURE: CPT

## 2017-10-30 PROCEDURE — 82947 ASSAY GLUCOSE BLOOD QUANT: CPT

## 2017-10-30 PROCEDURE — 86850 RBC ANTIBODY SCREEN: CPT

## 2017-10-30 PROCEDURE — 87070 CULTURE OTHR SPECIMN AEROBIC: CPT

## 2017-10-30 PROCEDURE — 97161 PT EVAL LOW COMPLEX 20 MIN: CPT

## 2017-10-30 PROCEDURE — 85610 PROTHROMBIN TIME: CPT

## 2017-10-30 PROCEDURE — 86780 TREPONEMA PALLIDUM: CPT

## 2017-10-30 PROCEDURE — 99214 OFFICE O/P EST MOD 30 MIN: CPT

## 2017-10-30 RX ADMIN — Medication 10 MILLIGRAM(S): at 00:04

## 2017-10-30 RX ADMIN — Medication 100 MILLIGRAM(S): at 11:51

## 2017-10-30 RX ADMIN — Medication 10 MILLIGRAM(S): at 06:06

## 2017-10-30 RX ADMIN — Medication 10 MILLIGRAM(S): at 11:51

## 2017-10-30 RX ADMIN — Medication 325 MILLIGRAM(S): at 11:51

## 2017-10-30 RX ADMIN — SODIUM CHLORIDE 125 MILLILITER(S): 9 INJECTION, SOLUTION INTRAVENOUS at 03:57

## 2017-10-30 NOTE — CHART NOTE - NSCHARTNOTEFT_GEN_A_CORE
Admitting Diagnosis:   Patient is a 37y old  Female who presents with a chief complaint of  contractions (25 Oct 2017 09:34)      PAST MEDICAL & SURGICAL HISTORY:      Current Nutrition Order: regular       PO Intake: Good (%) [   X]  Fair (50-75%) [   ] Poor (<25%) [   ]    GI Issues: None, last BM was this morning    Pain: none    Skin Integrity: intact    Labs: no updates on nutritionally relevant labs        CAPILLARY BLOOD GLUCOSE          Medications:  MEDICATIONS  (STANDING):  docusate sodium 100 milliGRAM(s) Oral daily  ferrous    sulfate 325 milliGRAM(s) Oral daily  heparin  Injectable 5000 Unit(s) SubCutaneous every 12 hours  lactated ringers. 1000 milliLiter(s) (125 mL/Hr) IV Continuous <Continuous>  NIFEdipine IR 10 milliGRAM(s) Oral every 6 hours  prenatal multivitamin 1 Tablet(s) Oral daily  progesterone vaginal capsule 200 milliGRAM(s) 200 milliGRAM(s) Vaginal at bedtime    MEDICATIONS  (PRN):  acetaminophen   Tablet. 650 milliGRAM(s) Oral every 6 hours PRN Mild Pain (1 - 3)      Weight: 10/14: 55.5 kg  Daily     Daily     Weight Change: no updates to assess    Estimated energy needs: ibw: 54.5kg, ppw: 45kg, BMI 17.0; 83% of IBW; since BMI<18.5 and PT expected to gain 28-40lb during pregnancy, IBW utilized for increased needs:  Kcal needs: 25-30kcal/IBW +452kcal: 1815-2087Kcal;   protein: 1.4-1.6g/IBW: 76.3-87.2g;    Fluid: 35-40ml/IBW: 1908-2180ml      Subjective: Pt reports >75% po intake; makes her own vegetarian selections from regular menu; denies any GI discomfort; w/ regular BMs, (last BM was today); pregnancy nutrition therapy reviewed, pt reports compliance w/ meeting needs; skin intact w/ no edema.     Previous Nutrition Diagnosis: Increased nutrient needs RT increased demand for nutrients per anabolism AEB pregnancy (3rd trimester)      Active [  X ]  Resolved [   ]    If resolved, new PES:     Goal: Pt to meet >75% EER without nutrient deficiencies    Recommendations: -encourage po intake w/ meals and snacks; provide pt's food preferences.   -continue w/ prenatal MVI   -provide/review nutrition education as needed     Education: pregnancy nutrition therapy     Risk Level: High [   ] Moderate [   X] Low [   ]

## 2017-11-03 DIAGNOSIS — O09.522 SUPERVISION OF ELDERLY MULTIGRAVIDA, SECOND TRIMESTER: ICD-10-CM

## 2017-11-03 DIAGNOSIS — O60.02 PRETERM LABOR WITHOUT DELIVERY, SECOND TRIMESTER: ICD-10-CM

## 2017-11-03 DIAGNOSIS — Z34.82 ENCOUNTER FOR SUPERVISION OF OTHER NORMAL PREGNANCY, SECOND TRIMESTER: ICD-10-CM

## 2017-11-03 DIAGNOSIS — T46.1X5A ADVERSE EFFECT OF CALCIUM-CHANNEL BLOCKERS, INITIAL ENCOUNTER: ICD-10-CM

## 2017-11-03 DIAGNOSIS — I95.2 HYPOTENSION DUE TO DRUGS: ICD-10-CM

## 2017-11-03 DIAGNOSIS — Z87.39 PERSONAL HISTORY OF OTHER DISEASES OF THE MUSCULOSKELETAL SYSTEM AND CONNECTIVE TISSUE: ICD-10-CM
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
## 2017-11-03 DIAGNOSIS — O26.872 CERVICAL SHORTENING, SECOND TRIMESTER: ICD-10-CM

## 2017-11-03 DIAGNOSIS — Z79.899 OTHER LONG TERM (CURRENT) DRUG THERAPY: ICD-10-CM

## 2017-11-03 DIAGNOSIS — O09.212 SUPERVISION OF PREGNANCY WITH HISTORY OF PRE-TERM LABOR, SECOND TRIMESTER: ICD-10-CM

## 2017-11-03 DIAGNOSIS — Z3A.30 30 WEEKS GESTATION OF PREGNANCY: ICD-10-CM

## 2017-11-21 ENCOUNTER — OUTPATIENT (OUTPATIENT)
Dept: OUTPATIENT SERVICES | Facility: HOSPITAL | Age: 37
LOS: 1 days | End: 2017-11-21
Payer: COMMERCIAL

## 2017-11-21 DIAGNOSIS — O26.899 OTHER SPECIFIED PREGNANCY RELATED CONDITIONS, UNSPECIFIED TRIMESTER: ICD-10-CM

## 2017-11-21 DIAGNOSIS — Z3A.00 WEEKS OF GESTATION OF PREGNANCY NOT SPECIFIED: ICD-10-CM

## 2017-11-21 PROCEDURE — 76818 FETAL BIOPHYS PROFILE W/NST: CPT

## 2017-11-21 PROCEDURE — 99214 OFFICE O/P EST MOD 30 MIN: CPT

## 2017-11-21 PROCEDURE — 96361 HYDRATE IV INFUSION ADD-ON: CPT

## 2017-11-21 PROCEDURE — 96365 THER/PROPH/DIAG IV INF INIT: CPT

## 2017-11-21 PROCEDURE — 96360 HYDRATION IV INFUSION INIT: CPT

## 2017-11-21 RX ORDER — SODIUM CHLORIDE 9 MG/ML
2000 INJECTION, SOLUTION INTRAVENOUS ONCE
Qty: 0 | Refills: 0 | Status: COMPLETED | OUTPATIENT
Start: 2017-11-21 | End: 2017-11-21

## 2017-11-21 RX ADMIN — SODIUM CHLORIDE 2000 MILLILITER(S): 9 INJECTION, SOLUTION INTRAVENOUS at 19:49

## 2017-12-18 ENCOUNTER — INPATIENT (INPATIENT)
Facility: HOSPITAL | Age: 37
LOS: 2 days | Discharge: HOME | End: 2017-12-21
Attending: OBSTETRICS & GYNECOLOGY | Admitting: OBSTETRICS & GYNECOLOGY
Payer: COMMERCIAL

## 2017-12-18 ENCOUNTER — RESULT REVIEW (OUTPATIENT)
Age: 37
End: 2017-12-18

## 2017-12-18 VITALS — WEIGHT: 132.28 LBS | HEIGHT: 64 IN

## 2017-12-18 DIAGNOSIS — Z3A.00 WEEKS OF GESTATION OF PREGNANCY NOT SPECIFIED: ICD-10-CM

## 2017-12-18 DIAGNOSIS — O26.899 OTHER SPECIFIED PREGNANCY RELATED CONDITIONS, UNSPECIFIED TRIMESTER: ICD-10-CM

## 2017-12-18 LAB
HCT VFR BLD CALC: 35.8 % — SIGNIFICANT CHANGE UP (ref 34.5–45)
HGB BLD-MCNC: 12.1 G/DL — SIGNIFICANT CHANGE UP (ref 11.5–15.5)
MCHC RBC-ENTMCNC: 29.5 PG — SIGNIFICANT CHANGE UP (ref 27–34)
MCHC RBC-ENTMCNC: 33.8 G/DL — SIGNIFICANT CHANGE UP (ref 32–36)
MCV RBC AUTO: 87.3 FL — SIGNIFICANT CHANGE UP (ref 80–100)
PLATELET # BLD AUTO: 209 K/UL — SIGNIFICANT CHANGE UP (ref 150–400)
RBC # BLD: 4.1 M/UL — SIGNIFICANT CHANGE UP (ref 3.8–5.2)
RBC # FLD: 12.9 % — SIGNIFICANT CHANGE UP (ref 10.3–16.9)
WBC # BLD: 13.4 K/UL — HIGH (ref 3.8–10.5)
WBC # FLD AUTO: 13.4 K/UL — HIGH (ref 3.8–10.5)

## 2017-12-18 RX ORDER — HEPARIN SODIUM 5000 [USP'U]/ML
5000 INJECTION INTRAVENOUS; SUBCUTANEOUS EVERY 12 HOURS
Qty: 0 | Refills: 0 | Status: DISCONTINUED | OUTPATIENT
Start: 2017-12-19 | End: 2017-12-21

## 2017-12-18 RX ORDER — GLYCERIN ADULT
1 SUPPOSITORY, RECTAL RECTAL AT BEDTIME
Qty: 0 | Refills: 0 | Status: DISCONTINUED | OUTPATIENT
Start: 2017-12-18 | End: 2017-12-21

## 2017-12-18 RX ORDER — SODIUM CHLORIDE 9 MG/ML
1000 INJECTION, SOLUTION INTRAVENOUS ONCE
Qty: 0 | Refills: 0 | Status: DISCONTINUED | OUTPATIENT
Start: 2017-12-18 | End: 2017-12-18

## 2017-12-18 RX ORDER — SIMETHICONE 80 MG/1
80 TABLET, CHEWABLE ORAL EVERY 4 HOURS
Qty: 0 | Refills: 0 | Status: DISCONTINUED | OUTPATIENT
Start: 2017-12-18 | End: 2017-12-21

## 2017-12-18 RX ORDER — ACETAMINOPHEN 500 MG
650 TABLET ORAL EVERY 6 HOURS
Qty: 0 | Refills: 0 | Status: DISCONTINUED | OUTPATIENT
Start: 2017-12-18 | End: 2017-12-21

## 2017-12-18 RX ORDER — SODIUM CHLORIDE 9 MG/ML
1000 INJECTION, SOLUTION INTRAVENOUS
Qty: 0 | Refills: 0 | Status: DISCONTINUED | OUTPATIENT
Start: 2017-12-18 | End: 2017-12-18

## 2017-12-18 RX ORDER — NALOXONE HYDROCHLORIDE 4 MG/.1ML
0.1 SPRAY NASAL
Qty: 0 | Refills: 0 | Status: DISCONTINUED | OUTPATIENT
Start: 2017-12-18 | End: 2017-12-21

## 2017-12-18 RX ORDER — TETANUS TOXOID, REDUCED DIPHTHERIA TOXOID AND ACELLULAR PERTUSSIS VACCINE, ADSORBED 5; 2.5; 8; 8; 2.5 [IU]/.5ML; [IU]/.5ML; UG/.5ML; UG/.5ML; UG/.5ML
0.5 SUSPENSION INTRAMUSCULAR ONCE
Qty: 0 | Refills: 0 | Status: COMPLETED | OUTPATIENT
Start: 2017-12-18 | End: 2018-11-16

## 2017-12-18 RX ORDER — OXYCODONE AND ACETAMINOPHEN 5; 325 MG/1; MG/1
1 TABLET ORAL
Qty: 0 | Refills: 0 | Status: DISCONTINUED | OUTPATIENT
Start: 2017-12-18 | End: 2017-12-21

## 2017-12-18 RX ORDER — CITRIC ACID/SODIUM CITRATE 300-500 MG
30 SOLUTION, ORAL ORAL ONCE
Qty: 0 | Refills: 0 | Status: COMPLETED | OUTPATIENT
Start: 2017-12-18 | End: 2017-12-18

## 2017-12-18 RX ORDER — OXYTOCIN 10 UNIT/ML
41.67 VIAL (ML) INJECTION
Qty: 20 | Refills: 0 | Status: DISCONTINUED | OUTPATIENT
Start: 2017-12-18 | End: 2017-12-21

## 2017-12-18 RX ORDER — DOCUSATE SODIUM 100 MG
100 CAPSULE ORAL
Qty: 0 | Refills: 0 | Status: DISCONTINUED | OUTPATIENT
Start: 2017-12-18 | End: 2017-12-21

## 2017-12-18 RX ORDER — CEFAZOLIN SODIUM 1 G
2000 VIAL (EA) INJECTION ONCE
Qty: 0 | Refills: 0 | Status: COMPLETED | OUTPATIENT
Start: 2017-12-18 | End: 2017-12-18

## 2017-12-18 RX ORDER — FERROUS SULFATE 325(65) MG
325 TABLET ORAL DAILY
Qty: 0 | Refills: 0 | Status: DISCONTINUED | OUTPATIENT
Start: 2017-12-18 | End: 2017-12-21

## 2017-12-18 RX ORDER — DIPHENHYDRAMINE HCL 50 MG
25 CAPSULE ORAL EVERY 6 HOURS
Qty: 0 | Refills: 0 | Status: DISCONTINUED | OUTPATIENT
Start: 2017-12-18 | End: 2017-12-21

## 2017-12-18 RX ORDER — SODIUM CHLORIDE 9 MG/ML
1000 INJECTION, SOLUTION INTRAVENOUS
Qty: 0 | Refills: 0 | Status: DISCONTINUED | OUTPATIENT
Start: 2017-12-18 | End: 2017-12-19

## 2017-12-18 RX ORDER — LANOLIN
1 OINTMENT (GRAM) TOPICAL
Qty: 0 | Refills: 0 | Status: DISCONTINUED | OUTPATIENT
Start: 2017-12-18 | End: 2017-12-21

## 2017-12-18 RX ORDER — OXYCODONE AND ACETAMINOPHEN 5; 325 MG/1; MG/1
2 TABLET ORAL EVERY 6 HOURS
Qty: 0 | Refills: 0 | Status: DISCONTINUED | OUTPATIENT
Start: 2017-12-18 | End: 2017-12-21

## 2017-12-18 RX ORDER — METOCLOPRAMIDE HCL 10 MG
10 TABLET ORAL ONCE
Qty: 0 | Refills: 0 | Status: DISCONTINUED | OUTPATIENT
Start: 2017-12-18 | End: 2017-12-18

## 2017-12-18 RX ORDER — ONDANSETRON 8 MG/1
4 TABLET, FILM COATED ORAL EVERY 6 HOURS
Qty: 0 | Refills: 0 | Status: DISCONTINUED | OUTPATIENT
Start: 2017-12-18 | End: 2017-12-21

## 2017-12-18 RX ORDER — OXYTOCIN 10 UNIT/ML
333.33 VIAL (ML) INJECTION
Qty: 20 | Refills: 0 | Status: DISCONTINUED | OUTPATIENT
Start: 2017-12-18 | End: 2017-12-21

## 2017-12-18 RX ORDER — IBUPROFEN 200 MG
600 TABLET ORAL EVERY 6 HOURS
Qty: 0 | Refills: 0 | Status: DISCONTINUED | OUTPATIENT
Start: 2017-12-18 | End: 2017-12-21

## 2017-12-18 RX ADMIN — SODIUM CHLORIDE 125 MILLILITER(S): 9 INJECTION, SOLUTION INTRAVENOUS at 18:16

## 2017-12-18 RX ADMIN — Medication 30 MILLILITER(S): at 20:40

## 2017-12-18 RX ADMIN — Medication 100 MILLIGRAM(S): at 20:45

## 2017-12-18 RX ADMIN — SODIUM CHLORIDE 125 MILLILITER(S): 9 INJECTION, SOLUTION INTRAVENOUS at 17:53

## 2017-12-19 LAB
HCT VFR BLD CALC: 31.7 % — LOW (ref 34.5–45)
HGB BLD-MCNC: 10.6 G/DL — LOW (ref 11.5–15.5)
MCHC RBC-ENTMCNC: 29.4 PG — SIGNIFICANT CHANGE UP (ref 27–34)
MCHC RBC-ENTMCNC: 33.4 G/DL — SIGNIFICANT CHANGE UP (ref 32–36)
MCV RBC AUTO: 88.1 FL — SIGNIFICANT CHANGE UP (ref 80–100)
PLATELET # BLD AUTO: 177 K/UL — SIGNIFICANT CHANGE UP (ref 150–400)
RBC # BLD: 3.6 M/UL — LOW (ref 3.8–5.2)
RBC # FLD: 13.1 % — SIGNIFICANT CHANGE UP (ref 10.3–16.9)
WBC # BLD: 21.1 K/UL — HIGH (ref 3.8–10.5)
WBC # FLD AUTO: 21.1 K/UL — HIGH (ref 3.8–10.5)

## 2017-12-19 RX ORDER — TETANUS TOXOID, REDUCED DIPHTHERIA TOXOID AND ACELLULAR PERTUSSIS VACCINE, ADSORBED 5; 2.5; 8; 8; 2.5 [IU]/.5ML; [IU]/.5ML; UG/.5ML; UG/.5ML; UG/.5ML
0.5 SUSPENSION INTRAMUSCULAR ONCE
Qty: 0 | Refills: 0 | Status: COMPLETED | OUTPATIENT
Start: 2017-12-19 | End: 2017-12-19

## 2017-12-19 RX ADMIN — Medication 600 MILLIGRAM(S): at 17:25

## 2017-12-19 RX ADMIN — Medication 325 MILLIGRAM(S): at 10:37

## 2017-12-19 RX ADMIN — Medication 1 TABLET(S): at 23:50

## 2017-12-19 RX ADMIN — Medication 1 APPLICATION(S): at 10:37

## 2017-12-19 RX ADMIN — Medication 100 MILLIGRAM(S): at 22:32

## 2017-12-19 RX ADMIN — Medication 600 MILLIGRAM(S): at 18:25

## 2017-12-19 RX ADMIN — Medication 125 MILLIUNIT(S)/MIN: at 00:30

## 2017-12-19 RX ADMIN — Medication 600 MILLIGRAM(S): at 04:23

## 2017-12-19 RX ADMIN — TETANUS TOXOID, REDUCED DIPHTHERIA TOXOID AND ACELLULAR PERTUSSIS VACCINE, ADSORBED 0.5 MILLILITER(S): 5; 2.5; 8; 8; 2.5 SUSPENSION INTRAMUSCULAR at 19:45

## 2017-12-19 RX ADMIN — SODIUM CHLORIDE 125 MILLILITER(S): 9 INJECTION, SOLUTION INTRAVENOUS at 00:45

## 2017-12-19 RX ADMIN — Medication 100 MILLIGRAM(S): at 10:38

## 2017-12-19 RX ADMIN — Medication 600 MILLIGRAM(S): at 10:38

## 2017-12-19 RX ADMIN — Medication 600 MILLIGRAM(S): at 11:30

## 2017-12-19 RX ADMIN — Medication 600 MILLIGRAM(S): at 05:30

## 2017-12-19 RX ADMIN — Medication 600 MILLIGRAM(S): at 23:44

## 2017-12-19 NOTE — PROGRESS NOTE ADULT - ASSESSMENT
A/P  38yo s/p c/s, POD #1 , stable  - Pain: PO analgesics   - GI: AAT  - : TOV   - Labs: am CBC pending   - DVT prophylaxis: SQH 5000u BID per protocol, SCDs in bed, ambulation encouraged  - Resp: IS encouraged  - Dispo: likely POD3 or POD4

## 2017-12-19 NOTE — PROGRESS NOTE ADULT - SUBJECTIVE AND OBJECTIVE BOX
Patient evaluated at bedside.   She has been tolerating water. She reports incisional discomfort is adequately controlled with Motrin for now.   She denies headache, dizziness, chest pain, palpitations, shortness of breathe, nausea, vomiting or heavy vaginal bleeding; has not passed flatus yet.    Physical Exam:  Vital Signs Last 24 Hrs  T(C): 36.8 (19 Dec 2017 05:59), Max: 36.9 (19 Dec 2017 03:00)  T(F): 98.3 (19 Dec 2017 05:59), Max: 98.4 (19 Dec 2017 03:00)  HR: 75 (19 Dec 2017 05:59) (70 - 82)  BP: 101/63 (19 Dec 2017 05:59) (101/63 - 121/77)  BP(mean): --  RR: 18 (19 Dec 2017 05:59) (16 - 33)  SpO2: 97% (19 Dec 2017 05:59) (97% - 99%)    Constitutional: Alert & Oriented x3, No acute distress, cooperative   Gastrointestinal: soft, mildy tender around incision,  no rebound or guarding; uterus firm at midline/ below umbilicus  Incision: dermabond matrix in place; area clean, dry and intact; no erythema or induration   : +hernandez, lochia WNL   Extremities: SCDs in place, no calf tenderness or swelling    Labs                       12.1   13.4  )-----------( 209      ( 18 Dec 2017 16:42 )             35.8

## 2017-12-20 LAB — T PALLIDUM AB TITR SER: NEGATIVE — SIGNIFICANT CHANGE UP

## 2017-12-20 RX ADMIN — Medication 600 MILLIGRAM(S): at 00:44

## 2017-12-20 RX ADMIN — Medication 650 MILLIGRAM(S): at 09:56

## 2017-12-20 RX ADMIN — Medication 600 MILLIGRAM(S): at 13:00

## 2017-12-20 RX ADMIN — Medication 600 MILLIGRAM(S): at 13:08

## 2017-12-20 RX ADMIN — Medication 650 MILLIGRAM(S): at 23:53

## 2017-12-20 RX ADMIN — Medication 600 MILLIGRAM(S): at 08:00

## 2017-12-20 RX ADMIN — Medication 600 MILLIGRAM(S): at 07:01

## 2017-12-20 RX ADMIN — Medication 100 MILLIGRAM(S): at 12:54

## 2017-12-20 RX ADMIN — Medication 650 MILLIGRAM(S): at 17:13

## 2017-12-20 RX ADMIN — Medication 1 TABLET(S): at 22:17

## 2017-12-20 RX ADMIN — Medication 325 MILLIGRAM(S): at 12:49

## 2017-12-20 RX ADMIN — Medication 600 MILLIGRAM(S): at 22:06

## 2017-12-20 RX ADMIN — Medication 600 MILLIGRAM(S): at 21:10

## 2017-12-20 NOTE — PROGRESS NOTE ADULT - SUBJECTIVE AND OBJECTIVE BOX
Patient evaluated at bedside. She reports pain is controlled and denies headache, vision changes, dizziness, chest pain, palpitations, shortness of breathe, nausea, vomiting or heavy vaginal bleeding. She has been ambulating without assistance, voiding spontaneously, passing gas, and tolerating regular diet.       Physical Exam:  Vital Signs Last 24 Hrs  T(C): 36.7 (20 Dec 2017 06:17), Max: 36.7 (19 Dec 2017 14:10)  T(F): 98 (20 Dec 2017 06:17), Max: 98.1 (19 Dec 2017 14:10)  HR: 77 (20 Dec 2017 06:17) (70 - 77)  BP: 98/62 (20 Dec 2017 06:17) (98/60 - 105/68)  BP(mean): --  RR: 18 (20 Dec 2017 06:17) (16 - 18)  SpO2: 97% (20 Dec 2017 06:17) (97% - 98%)    GA: NAD, A+0 x 3  Abd: soft, nontender, nondistended, incision clean, dry and intact, uterus firm/midline/below umbilicus  : lochia WNL, s/p Altman  Extremities: no tenderness    LABS                        10.6   21.1  )-----------( 177      ( 19 Dec 2017 11:25 )             31.7

## 2017-12-20 NOTE — PROGRESS NOTE ADULT - ASSESSMENT
A/P  36yo s/p c/s, POD # 2 , stable  - Pain: PO analgesics   - GI: Regular diet   - : --  - Resp: incentive spirometer 10x/hr   - DVT prophylaxis: SQH BID, SCDs in bed   - Dispo: anticipated POD3

## 2017-12-21 ENCOUNTER — TRANSCRIPTION ENCOUNTER (OUTPATIENT)
Age: 37
End: 2017-12-21

## 2017-12-21 VITALS
OXYGEN SATURATION: 98 % | TEMPERATURE: 98 F | SYSTOLIC BLOOD PRESSURE: 121 MMHG | DIASTOLIC BLOOD PRESSURE: 86 MMHG | HEART RATE: 67 BPM | RESPIRATION RATE: 18 BRPM

## 2017-12-21 PROCEDURE — 86780 TREPONEMA PALLIDUM: CPT

## 2017-12-21 PROCEDURE — 86850 RBC ANTIBODY SCREEN: CPT

## 2017-12-21 PROCEDURE — 36415 COLL VENOUS BLD VENIPUNCTURE: CPT

## 2017-12-21 PROCEDURE — 86900 BLOOD TYPING SEROLOGIC ABO: CPT

## 2017-12-21 PROCEDURE — 88307 TISSUE EXAM BY PATHOLOGIST: CPT

## 2017-12-21 PROCEDURE — 90715 TDAP VACCINE 7 YRS/> IM: CPT

## 2017-12-21 PROCEDURE — 86901 BLOOD TYPING SEROLOGIC RH(D): CPT

## 2017-12-21 PROCEDURE — 99214 OFFICE O/P EST MOD 30 MIN: CPT

## 2017-12-21 PROCEDURE — 85027 COMPLETE CBC AUTOMATED: CPT

## 2017-12-21 RX ADMIN — Medication 100 MILLIGRAM(S): at 12:49

## 2017-12-21 RX ADMIN — Medication 600 MILLIGRAM(S): at 12:00

## 2017-12-21 RX ADMIN — Medication 600 MILLIGRAM(S): at 03:24

## 2017-12-21 RX ADMIN — Medication 600 MILLIGRAM(S): at 04:15

## 2017-12-21 RX ADMIN — Medication 650 MILLIGRAM(S): at 15:15

## 2017-12-21 RX ADMIN — Medication 650 MILLIGRAM(S): at 06:46

## 2017-12-21 RX ADMIN — Medication 600 MILLIGRAM(S): at 11:18

## 2017-12-21 NOTE — DISCHARGE NOTE OB - MATERIALS PROVIDED
Tdap Vaccination (VIS Pub Date: 2012)/Shaken Baby Prevention Handout/  Immunization Record/Breastfeeding Mother’s Support Group Information/Guide to Postpartum Care/Back To Sleep Handout/Birth Certificate Instructions/Breastfeeding Log/Vaccinations/[facility]  Screening Program/[facility] Hearing Screen Program/Breastfeeding Guide and Packet

## 2017-12-21 NOTE — PROGRESS NOTE ADULT - SUBJECTIVE AND OBJECTIVE BOX
Patient evaluated at bedside. She has no active complaints - reports pain is controlled and denies headache, vision changes, dizziness, chest pain, palpitations, shortness of breathe, nausea, vomiting or heavy vaginal bleeding. She has been ambulating without assistance, voiding spontaneously, passing gas, and tolerating regular diet.       Physical Exam:  Vital Signs Last 24 Hrs  T(C): 36.6 (21 Dec 2017 06:00), Max: 37 (20 Dec 2017 10:00)  T(F): 97.8 (21 Dec 2017 06:00), Max: 98.6 (20 Dec 2017 10:00)  HR: 67 (21 Dec 2017 06:00) (67 - 86)  BP: 115/78 (21 Dec 2017 06:00) (105/74 - 115/78)  BP(mean): --  RR: 18 (21 Dec 2017 06:00) (18 - 18)  SpO2: 98% (21 Dec 2017 06:00) (98% - 98%)    GA: NAD, A+0 x 3  Abd: soft, nontender, nondistended, incision clean, dry and intact, uterus firm/midline/below umbilicus  : lochia WNL, s/p Altman  Extremities: nontender

## 2017-12-21 NOTE — PROGRESS NOTE ADULT - ASSESSMENT
A/P  36yo s/p c/s, POD # 3, stable  - Pain: PO analgesics   - GI: Regular diet   - : --  - Resp: incentive spirometer 10x/hr   - DVT prophylaxis: SQH BID, SCDs in bed   - Dispo: medically stable to be discharged home today

## 2017-12-21 NOTE — DISCHARGE NOTE OB - CARE PROVIDER_API CALL
Bonifacio Mims), Obstetrics and Gynecology  [address] [place] [address]  Phone: [phone]  Fax: [phone]

## 2017-12-21 NOTE — DISCHARGE NOTE OB - PATIENT PORTAL LINK FT
“You can access the FollowHealth Patient Portal, offered by [facility], by registering with the following website: [email or web address]”

## 2017-12-26 DIAGNOSIS — O09.212 SUPERVISION OF PREGNANCY WITH HISTORY OF PRE-TERM LABOR, SECOND TRIMESTER: ICD-10-CM

## 2017-12-26 DIAGNOSIS — Z34.93 ENCOUNTER FOR SUPERVISION OF NORMAL PREGNANCY, UNSPECIFIED, THIRD TRIMESTER: ICD-10-CM

## 2017-12-26 DIAGNOSIS — Z79.899 OTHER LONG TERM (CURRENT) DRUG THERAPY: ICD-10-CM

## 2017-12-26 DIAGNOSIS — O34.212 MATERNAL CARE FOR VERTICAL SCAR FROM PREVIOUS CESAREAN DELIVERY: ICD-10-CM

## 2017-12-26 DIAGNOSIS — Z91.018 ALLERGY TO OTHER FOODS: ICD-10-CM

## 2017-12-26 DIAGNOSIS — O34.13 MATERNAL CARE FOR BENIGN TUMOR OF CORPUS UTERI, THIRD TRIMESTER: ICD-10-CM

## 2017-12-26 DIAGNOSIS — O26.873 CERVICAL SHORTENING, THIRD TRIMESTER: ICD-10-CM

## 2017-12-26 DIAGNOSIS — Z28.21 IMMUNIZATION NOT CARRIED OUT BECAUSE OF PATIENT REFUSAL: ICD-10-CM

## 2017-12-26 DIAGNOSIS — Z28.09 IMMUNIZATION NOT CARRIED OUT BECAUSE OF OTHER CONTRAINDICATION: ICD-10-CM

## 2017-12-26 DIAGNOSIS — D25.1 INTRAMURAL LEIOMYOMA OF UTERUS: ICD-10-CM

## 2017-12-26 DIAGNOSIS — Z23 ENCOUNTER FOR IMMUNIZATION: ICD-10-CM

## 2017-12-26 DIAGNOSIS — Z3A.37 37 WEEKS GESTATION OF PREGNANCY: ICD-10-CM

## 2017-12-27 LAB — SURGICAL PATHOLOGY STUDY: SIGNIFICANT CHANGE UP

## 2019-02-01 NOTE — PATIENT PROFILE OB - FUNCTIONAL SCREEN CURRENT LEVEL: EATING, MLM
----- Message from Rebel Ordaz sent at 2/1/2019  2:43 PM CST -----  Contact: self  Requesting call back regarding pt being over 20 minutes late for appt and wants to know if she needs to r/s. Please call back at [phone].    Thanks,  Rebel Ordaz     (0) independent

## 2021-09-03 NOTE — PATIENT PROFILE OB - HEIGHT IN INCHES
I think I have her enough for 8 mg daily to lay until Tuesday. We will see if she shows up then.  
Patient reports that MTM cancelled because she did not confirm her appointment. Pt did present to her H&P today with Dr. Villa. No COWS has been done as pt has not presented to programming after her  appointment this morning. Pt still needs to sign treatment plan. Writer attempted to contact pt to see how much suboxone she has taken and if it was helpful, any cravings, side effects ect. Writer attempted to call both numbers provided, no answer. [phone] and [phone] (text mail subscriber numbers, as pt only gets service while connected to [employer]      Cathy Flores RN BSN  
Pt called writer back reporting, \"yeah I don't know if you know but MTM messed up today again.\" They told me \"that I have to call the day before and I called today and they won't give me a ride.\" Pt reports she is calling them today back to confirm her ride for Tuesday. Pt reports her neighbor took her to her H&P today but they couldn't wait around for her for programming. Pt reports \"I haven't picked up my meds yet and I hope I have enough for the weekend. Writer informed pt there was enough medication for pt to start with 1/2 8mg suboxone today and if she still needs the other half she can take it. Pt reports she has been on it in the past with no issues. Pt is aware her max dose is 8mg until she is reassessed on Tuesday 9/7/21.      Cathy Flores RN BSN  
4

## 2022-03-28 NOTE — PHYSICAL THERAPY INITIAL EVALUATION ADULT - DID THE PATIENT HAVE SURGERY?
n/a Airway patent, TM normal bilaterally, normal appearing mouth, nose, throat, neck supple with full range of motion, no cervical adenopathy.

## 2022-09-27 NOTE — PHYSICAL THERAPY INITIAL EVALUATION ADULT - DISCHARGE DISPOSITION, PT EVAL
Department of Anesthesiology  Postprocedure Note    Patient: May Márquez  MRN: [id number]  YOB: 1956  Date of evaluation: 9/27/2022      Procedure Summary     Date: 09/27/22 Room / Location: [facility] / [facility]    Anesthesia Start: 5271 Anesthesia Stop: 0083    Procedure: Cystoscopy Bilateral Retrograde Pyelogram and pelvic exam (Bilateral) Diagnosis:       Gross hematuria      (Gross hematuria [R31.0])    Surgeons: Kd Finn MD Responsible Provider: Katheryn Brink MD    Anesthesia Type: MAC ASA Status: 3          Anesthesia Type: No value filed.     Orlando Phase I: Orlando Score: 10    Orlando Phase II:        Anesthesia Post Evaluation    Patient location during evaluation: bedside  Patient participation: complete - patient participated  Level of consciousness: awake and alert  Airway patency: patent  Nausea & Vomiting: no nausea and no vomiting  Complications: no  Cardiovascular status: hemodynamically stable  Respiratory status: acceptable, room air and spontaneous ventilation  Hydration status: euvolemic home

## 2024-05-21 NOTE — DISCHARGE NOTE OB - CARE PLAN
[facility] Clinical Data  [facility] Department of Endocrinology Diabetes and Metabolism   [address], [place], [address]   Phone: [phone]  Fax: [phone]    Date of Service: 2024  Primary Care Physician: Virginia Sanders MD  Referring physician: No ref. provider found  Provider: DESTINY Jasso - CNS    Reason for the visit:  DM type 2     History of Present Illness:  The history is provided by the patient. No  was used. Accuracy of the patient data is excellent.  Selene Alfaro is a very pleasant 75 y.o. female seen today for diabetes management     Selene Alfaro was diagnosed with diabetes for many years and currently on Metformin 500 mg BID   The patient has been checking blood sugar few times/wk    Most BG readings are at goal    Most recent A1c results summarized below  Lab Results   Component Value Date/Time    LABA1C 6.4 2024 10:10 AM    LABA1C 6.6 2023 11:02 AM    LABA1C 7.2 2023 11:04 AM     Patient has had no hypoglycemic episodes   The patient hasn't been mindful of what has been eating and wasn't following diabetes diet    I reviewed current medications and the patient has no issues with diabetes medications  Selene Alfaro is up to date with eye exam and denied any history of diabetic retinopathy  The patient performs her own feet care  Microvascular complications:  No Retinopathy, no Nephropathy +  Neuropathy   Macrovascular complications: no CAD, PVD, or Stroke  The patient refuses Flushot and pneumonia vaccine     PAST MEDICAL HISTORY   Past Medical History:   Diagnosis Date    Asthma     Diabetes mellitus (HCC)     Hypertension     Tear meniscus knee     right  and left       PAST SURGICAL HISTORY   Past Surgical History:   Procedure Laterality Date     SECTION      KNEE ARTHROSCOPY      right    OVARIAN CYST REMOVAL      right       SOCIAL HISTORY   Tobacco:   reports that she has never smoked. She does not 
Principal Discharge DX:	Postpartum state  Goal:	resume normal activity  Instructions for follow-up, activity and diet:	Follow up with your OB in 2 weeks. Call the office to schedule your post partum visit.  Regular diet. No heavy lifting. Pelvic rest for 6 weeks.  This includes no tampons, douching or intercourse. Call with any signs of symptoms of infection including fever > 100.4 degrees, severe pain, malodorous vaginal discharge or bleeding > 1 pad/hour. Motrin 600 mg orally every 6 hours for pain. Continue your prenatal vitamins as directed. Contraception options, including IUD can be further discussed at the post partum visit.

## 2025-07-17 NOTE — DISCHARGE NOTE OB - NS AS DC PROVIDER CONTACT Y/N MULTI
you have symptoms of a heart attack. These may include:    Chest pain or pressure, or a strange feeling in the chest.     Sweating.     Shortness of breath.     Pain, pressure, or a strange feeling in the back, neck, jaw, or upper belly or in one or both shoulders or arms.     Lightheadedness or sudden weakness.     A fast or irregular heartbeat.   After you call 911, the  may tell you to chew 1 adult-strength or 2 to 4 low-dose aspirin. Wait for an ambulance. Do not try to drive yourself.  Watch closely for changes in your health, and be sure to contact your doctor if you have any problems.  Where can you learn more?  Go to https://www.[employer].net/patientEd and enter F075 to learn more about \"A Healthy Heart: Care Instructions.\"  Current as of: July 31, 2024  Content Version: 14.5  © 2900-0124 [employer].   Care instructions adapted under license by [employer]. If you have questions about a medical condition or this instruction, always ask your healthcare professional. [employer] disclaims any warranty or liability for your use of this information.    Personalized Preventive Plan for Lila Dickens - 7/17/2025  Medicare offers a range of preventive health benefits. Some of the tests and screenings are paid in full while other may be subject to a deductible, co-insurance, and/or copay.  Some of these benefits include a comprehensive review of your medical history including lifestyle, illnesses that may run in your family, and various assessments and screenings as appropriate.  After reviewing your medical record and screening and assessments performed today your provider may have ordered immunizations, labs, imaging, and/or referrals for you.  A list of these orders (if applicable) as well as your Preventive Care list are included within your After Visit Summary for your review.       Yes